# Patient Record
Sex: FEMALE | Race: WHITE
[De-identification: names, ages, dates, MRNs, and addresses within clinical notes are randomized per-mention and may not be internally consistent; named-entity substitution may affect disease eponyms.]

---

## 2020-07-10 ENCOUNTER — HOSPITAL ENCOUNTER (EMERGENCY)
Dept: HOSPITAL 41 - JD.ED | Age: 25
Discharge: HOME | End: 2020-07-10
Payer: MEDICAID

## 2020-07-10 DIAGNOSIS — K52.9: ICD-10-CM

## 2020-07-10 DIAGNOSIS — O99.612: Primary | ICD-10-CM

## 2020-07-10 DIAGNOSIS — Z3A.24: ICD-10-CM

## 2020-07-10 PROCEDURE — 80053 COMPREHEN METABOLIC PANEL: CPT

## 2020-07-10 PROCEDURE — 36415 COLL VENOUS BLD VENIPUNCTURE: CPT

## 2020-07-10 PROCEDURE — 81001 URINALYSIS AUTO W/SCOPE: CPT

## 2020-07-10 PROCEDURE — 86140 C-REACTIVE PROTEIN: CPT

## 2020-07-10 PROCEDURE — 82150 ASSAY OF AMYLASE: CPT

## 2020-07-10 PROCEDURE — 76705 ECHO EXAM OF ABDOMEN: CPT

## 2020-07-10 PROCEDURE — 83690 ASSAY OF LIPASE: CPT

## 2020-07-10 PROCEDURE — 85025 COMPLETE CBC W/AUTO DIFF WBC: CPT

## 2020-07-10 PROCEDURE — 96361 HYDRATE IV INFUSION ADD-ON: CPT

## 2020-07-10 PROCEDURE — 99284 EMERGENCY DEPT VISIT MOD MDM: CPT

## 2020-07-10 PROCEDURE — 96374 THER/PROPH/DIAG INJ IV PUSH: CPT

## 2020-07-10 NOTE — US
Limited abdominal ultrasound: Multiple real-time images of the upper 

right abdomen were obtained.

 

Visualized portion of the pancreas shows no discrete abnormality.  

Liver contains no focal parenchymal abnormality.  Minimal sludge is 

seen within the gallbladder with no definite shadowing gallstones 

being seen.  No gallbladder wall thickening or biliary duct dilatation

 is seen.  Collecting system of the right kidney is slightly 

prominent.  Right kidney has a length of 12.2 cm.  Inferior vena cava 

is patent.  Main portal vein shows normal hepatopedal flow.

 

Impression:

1.  Sludge within the gallbladder.  No shadowing gallstones are 

appreciated.  No gallbladder wall thickening or biliary duct 

dilatation is seen.

2.  Slightly prominent collecting system within the right kidney.  

This may relate to early hydronephrosis of pregnancy.

3.  No additional abnormality is appreciated on right upper quadrant 

abdominal ultrasound.

 

Diagnostic code #2

 

This report was dictated in MDT

## 2020-07-10 NOTE — EDM.PDOC
ED HPI GENERAL MEDICAL PROBLEM





- General


Chief Complaint: Abdominal Pain


Stated Complaint: Right upper abdominal pain


Time Seen by Provider: 07/10/20 15:43


Source of Information: Reports: Patient


History Limitations: Reports: No Limitations





- History of Present Illness


INITIAL COMMENTS - FREE TEXT/NARRATIVE: 





Patient is a 25-year-old female G3, P1 24 weeks gestation who presents to the 

emergency department with complaints of right upper quadrant and epigastric 

abdominal pain that began around midnight last night.  She states that she ate 

lasagna for dinner around 8 PM last evening.  At midnight she began to 

experience intense epigastric and right upper quadrant abdominal pain that was 

sharp and cramping in nature.  She had an episode of vomiting at approximately 

4:00 this morning which she states was undigested lasagna.  She took a Phenergan

at that time and has had no further vomiting since.  Since its onset, the pain 

has improved significantly is now comes only intermittently and is much more 

mild.  She estimates that the pain occurs about once an hour and last about 7 to

8 seconds.  She describes it as sharp and crampy in nature.  She had no further 

episodes of vomiting since 4:00 this morning; however, she has had watery 

diarrhea since 6:00 this morning.  Fetus has been very active.  Patient does 

still have her gallbladder.  She denies any other chronic health conditions.  

She has had no fever or chills.  Denies any uterine cramping, back pain or 

abnormal vaginal discharge.  No known sick contacts.  None of the individuals 

who ate the same meal as her have experienced illness.


  ** Right Upper Abdomen


Pain Score (Numeric/FACES): 4





- Related Data


                                    Allergies











Allergy/AdvReac Type Severity Reaction Status Date / Time


 


No Known Allergies Allergy   Verified 07/10/20 15:24











Home Meds: 


                                    Home Meds





Dicyclomine [Bentyl] 20 mg PO Q8H PRN #9 tab 07/10/20 [Rx]


Prenat 115/Iron Fum/Folic/Dss [Prenatal 19 Tablet] 1 each PO DAILY 07/10/20 

[History]


Promethazine [Phenergan] 12.5 mg PO BID PRN 07/10/20 [History]











Past Medical History


Cardiovascular History: Reports: None


Respiratory History: Reports: None


Gastrointestinal History: Reports: None


Genitourinary History: Reports: None


OB/GYN History: Reports: Pregnancy, Spontaneous 


Musculoskeletal History: Reports: None


Neurological History: Reports: None


Psychiatric History: Reports: None


Endocrine/Metabolic History: Reports: None


Hematologic History: Reports: None


Immunologic History: Reports: None


Oncologic (Cancer) History: Reports: None


Dermatologic History: Reports: None





- Infectious Disease History


Infectious Disease History: Reports: None





- Past Surgical History


HEENT Surgical History: Reports: Oral Surgery





Social & Family History





- Tobacco Use


Smoking Status *Q: Never Smoker





- Caffeine Use


Caffeine Use: Reports: Soda





- Recreational Drug Use


Recreational Drug Use: No





ED ROS GENERAL





- Review of Systems


Review Of Systems: See Below


Constitutional: Reports: No Symptoms.  Denies: Fever, Chills, Weakness


HEENT: Reports: No Symptoms


Respiratory: Reports: No Symptoms


Cardiovascular: Reports: No Symptoms


Endocrine: Reports: No Symptoms


GI/Abdominal: Reports: Abdominal Pain, Diarrhea, Nausea, Vomiting.  Denies: 

Black Stool, Bloody Stool


: Reports: No Symptoms.  Denies: Dysuria, Flank Pain, Frequency


Musculoskeletal: Reports: No Symptoms


Skin: Reports: No Symptoms


Neurological: Reports: No Symptoms


Psychiatric: Reports: No Symptoms


Hematologic/Lymphatic: Reports: No Symptoms


Immunologic: Reports: No Symptoms





ED EXAM, GI/ABD





- Physical Exam


Exam: See Below


Exam Limited By: No Limitations


General Appearance: Alert, WD/WN, No Apparent Distress


Respiratory/Chest: No Respiratory Distress, Lungs Clear, Normal Breath Sounds, 

No Accessory Muscle Use, Chest Non-Tender


Cardiovascular: Normal Peripheral Pulses, Regular Rate, Rhythm, No Edema, No 

Gallop, No JVD, No Murmur, No Rub


GI/Abdominal Exam: Normal Bowel Sounds, Soft, No Organomegaly, No Distention, No

 Abnormal Bruit, No Mass, Pelvis Stable, Tender (Mild epigastric and right upper

 quadrant.).  No: Guarding, Rigid, Rebound


Neurological: Alert, Oriented, CN II-XII Intact, Normal Cognition, Normal Gait, 

Normal Reflexes, No Motor/Sensory Deficits


Psychiatric: Normal Affect, Normal Mood


Skin Exam: Warm, Dry, Intact, Normal Color, No Rash





Course





- Vital Signs


Last Recorded V/S: 


                                Last Vital Signs











Temp  97.1 F   07/10/20 15:21


 


Pulse  97   07/10/20 15:21


 


Resp  16   07/10/20 15:21


 


BP  130/77   07/10/20 15:21


 


Pulse Ox  96   07/10/20 15:21














- Orders/Labs/Meds


Orders: 


                               Active Orders 24 hr











 Category Date Time Status


 


 Peripheral IV Care [RC] .AS DIRECTED Care  07/10/20 15:54 Active


 


 Peripheral IV Insertion Adult [OM.PC] Stat Oth  07/10/20 15:54 Ordered











Labs: 


                                Laboratory Tests











  07/10/20 07/10/20 07/10/20 Range/Units





  15:45 15:45 16:03 


 


WBC  15.05 H    (3.98-10.04)  K/mm3


 


RBC  4.30    (3.98-5.22)  M/mm3


 


Hgb  12.9    (11.2-15.7)  gm/dl


 


Hct  38.8    (34.1-44.9)  %


 


MCV  90.2    (79.4-94.8)  fl


 


MCH  30.0    (25.6-32.2)  pg


 


MCHC  33.2    (32.2-35.5)  g/dl


 


RDW Std Deviation  42.3    (36.4-46.3)  fL


 


Plt Count  322    (182-369)  K/mm3


 


MPV  9.7    (9.4-12.3)  fl


 


Neut % (Auto)  85.8 H    (34.0-71.1)  %


 


Lymph % (Auto)  8.5 L    (19.3-51.7)  %


 


Mono % (Auto)  5.0    (4.7-12.5)  %


 


Eos % (Auto)  0.1 L    (0.7-5.8)  


 


Baso % (Auto)  0.1    (0.1-1.2)  %


 


Neut # (Auto)  12.93 H    (1.56-6.13)  K/mm3


 


Lymph # (Auto)  1.28    (1.18-3.74)  K/mm3


 


Mono # (Auto)  0.75 H    (0.24-0.36)  K/mm3


 


Eos # (Auto)  0.01 L    (0.04-0.36)  K/mm3


 


Baso # (Auto)  0.01    (0.01-0.08)  K/mm3


 


Manual Slide Review  Abnormal smear    


 


Sodium   136   (136-145)  mEq/L


 


Potassium   3.8   (3.5-5.1)  mEq/L


 


Chloride   102   ()  mEq/L


 


Carbon Dioxide   20 L   (21-32)  mEq/L


 


Anion Gap   17.8 H   (5-15)  


 


BUN   8   (7-18)  mg/dL


 


Creatinine   0.5 L   (0.55-1.02)  mg/dL


 


Est Cr Clr Drug Dosing   148.53   mL/min


 


Estimated GFR (MDRD)   > 60   (>60)  mL/min


 


BUN/Creatinine Ratio   16.0   (14-18)  


 


Glucose   84   ()  mg/dL


 


Calcium   8.6   (8.5-10.1)  mg/dL


 


Total Bilirubin   0.5   (0.2-1.0)  mg/dL


 


AST   19   (15-37)  U/L


 


ALT   28   (14-59)  U/L


 


Alkaline Phosphatase   63   ()  U/L


 


C-Reactive Protein   2.7 H*   (<1.0)  mg/dL


 


Total Protein   6.6   (6.4-8.2)  g/dl


 


Albumin   3.1 L   (3.4-5.0)  g/dl


 


Globulin   3.5   gm/dL


 


Albumin/Globulin Ratio   0.9 L   (1-2)  


 


Amylase   32   ()  U/L


 


Lipase   89   ()  U/L


 


Urine Color    Yellow  (Yellow)  


 


Urine Appearance    Clear  (Clear)  


 


Urine pH    6.0  (5.0-8.0)  


 


Ur Specific Gravity    > or = 1.030  (1.005-1.030)  


 


Urine Protein    Negative  (Negative)  


 


Urine Glucose (UA)    Negative  (Negative)  


 


Urine Ketones    4+ H  (Negative)  


 


Urine Occult Blood    Negative  (Negative)  


 


Urine Nitrite    Negative  (Negative)  


 


Urine Bilirubin    1+ H  (Negative)  


 


Urine Urobilinogen    0.2  (0.2-1.0)  


 


Ur Leukocyte Esterase    Negative  (Negative)  


 


Urine RBC    0-5  (0-5)  /hpf


 


Urine WBC    0-5  (0-5)  /hpf


 


Ur Squamous Epith Cells    5-10 H  (0-5)  /hpf


 


Urine Bacteria    Few  (FEW)  /hpf


 


Urine Mucus    Many H  (FEW)  /hpf











Meds: 


Medications














Discontinued Medications














Generic Name Dose Route Start Last Admin





  Trade Name Freq  PRN Reason Stop Dose Admin


 


Dicyclomine HCl  20 mg  07/10/20 18:27  07/10/20 18:38





  Bentyl  PO  07/10/20 18:28  20 mg





  ONETIME ONE   Administration


 


Sodium Chloride  1,000 mls @ 999 mls/hr  07/10/20 16:00  07/10/20 16:05





  Normal Saline  IV   999 mls/hr





  ASDIRECTED MOJGAN   Administration


 


Ondansetron HCl  4 mg  07/10/20 15:56  07/10/20 16:05





  Zofran  IVPUSH  07/10/20 15:57  4 mg





  ONETIME ONE   Administration


 


Sodium Chloride  10 ml  07/10/20 15:54  07/10/20 16:05





  Saline Flush  FLUSH   10 ml





  ASDIRECTED PRN   Administration





  Keep Vein Open  














- Re-Assessments/Exams


Free Text/Narrative Re-Assessment/Exam: 





07/10/20 1800


Hematology was significant for WBC elevated at 15.05, anion gap slightly 

elevated at 17.8, CRP minimally elevated at 2.7.  Urinalysis was negative for 

infection.  Right upper quadrant ultrasound showed sludge within the gallbladder

 with no visible stones, gallbladder wall thickening or biliary duct dilatation.

  Patient has had no return of pain or vomiting thus far.  We will give her some

 fluids to drink to see how she tolerates them.  Discussed that it is possible 

that she is experiencing gallbladder pain, however in light of her diarrhea, 

gastroenteritis is also a definite possibility.





7/10/20 1830


Patient has been able to keep fluids down well, however she did have a brief 

episode of abdominal cramping after drinking fluids.  Levsin is category 3 in 

pregnancy, therefore we will not use this.  We will try Bentyl to relieve her 

abdominal cramping.





07/10/20 1905


Patient has had no recurrence of pain.  She would like to be discharged home 

with the understanding that if her pain should worsen she should return to the 

emergency department.  I will recommend a clear liquid diet for the next 24 to 

48 hours and then advance as tolerated.  I will send a prescription for Bentyl 

for the next few days.  Discharge instructions as documented.








Departure





- Departure


Time of Disposition: 19:10


Disposition: Home, Self-Care 01


Condition: Good


Clinical Impression: 


 Gastroenteritis








- Discharge Information


*PRESCRIPTION DRUG MONITORING PROGRAM REVIEWED*: No


*COPY OF PRESCRIPTION DRUG MONITORING REPORT IN PATIENT AHMET: No


Prescriptions: 


Dicyclomine [Bentyl] 20 mg PO Q8H PRN #9 tab


 PRN Reason: Abdominal Pain


Instructions:  Viral Gastroenteritis, Adult, Easy-to-Read


Referrals: 


PCP,Not In Area [Primary Care Provider] - 


Forms:  ED Department Discharge


Additional Instructions: 


You were seen in the emergency department today for right upper abdominal pain. 

Your work-up included blood work, urinalysis, and ultrasound of your right upper

abdomen.  Your blood work was found to be overall normal.  Urinalysis did not 

show any infection.  As we discussed, there was some sludge found within the 

gallbladder, however there was no signs of inflammation or obstruction of the 

biliary tract.  It is possible that your pain is related to gallbladder, 

however, it is more likely that you are suffering from a gastroenteritis.  





While in the ER you received a liter of IV fluids, Zofran for nausea, and Bentyl

for abdominal cramping.





I would recommend that you maintain a clear liquid diet for the next 24 to 48 

hours.  After that time you may slowly advance as tolerated eating only bland 

foods.  Avoid dairy products or fruit juices until symptoms have completely 

resolved and you are having formed stools.  A prescription for Bentyl has been 

sent to [facility].  Use this medication as prescribed as 

needed for abdominal cramping.





As we discussed, your symptoms should continue to improve as opposed to worsen. 

If you should experience worsening symptoms, I would recommend that you return 

to the emergency department for reevaluation.





Sepsis Event Note (ED)





- Evaluation


Sepsis Screening Result: No Definite Risk





- Focused Exam


Vital Signs: 


                                   Vital Signs











  Temp Pulse Resp BP Pulse Ox


 


 07/10/20 15:21  97.1 F  97  16  130/77  96














- My Orders


Last 24 Hours: 


My Active Orders





07/10/20 15:54


Peripheral IV Care [RC] .AS DIRECTED 


Peripheral IV Insertion Adult [OM.PC] Stat 














- Assessment/Plan


Last 24 Hours: 


My Active Orders





07/10/20 15:54


Peripheral IV Care [RC] .AS DIRECTED 


Peripheral IV Insertion Adult [OM.PC] Stat